# Patient Record
Sex: MALE | Race: OTHER | HISPANIC OR LATINO | URBAN - METROPOLITAN AREA
[De-identification: names, ages, dates, MRNs, and addresses within clinical notes are randomized per-mention and may not be internally consistent; named-entity substitution may affect disease eponyms.]

---

## 2019-02-02 ENCOUNTER — INPATIENT (INPATIENT)
Facility: HOSPITAL | Age: 54
LOS: 2 days | Discharge: HOME | DRG: 948 | End: 2019-02-05
Attending: PSYCHIATRY & NEUROLOGY | Admitting: PSYCHIATRY & NEUROLOGY
Payer: COMMERCIAL

## 2019-02-02 VITALS
OXYGEN SATURATION: 98 % | HEART RATE: 61 BPM | DIASTOLIC BLOOD PRESSURE: 113 MMHG | TEMPERATURE: 98 F | RESPIRATION RATE: 18 BRPM | WEIGHT: 189.6 LBS | SYSTOLIC BLOOD PRESSURE: 188 MMHG

## 2019-02-02 DIAGNOSIS — Z98.890 OTHER SPECIFIED POSTPROCEDURAL STATES: Chronic | ICD-10-CM

## 2019-02-02 LAB
ALBUMIN SERPL ELPH-MCNC: 4.5 G/DL — SIGNIFICANT CHANGE UP (ref 3.3–5)
ALP SERPL-CCNC: 84 U/L — SIGNIFICANT CHANGE UP (ref 40–120)
ALT FLD-CCNC: 69 U/L — HIGH (ref 10–45)
ANION GAP SERPL CALC-SCNC: 12 MMOL/L — SIGNIFICANT CHANGE UP (ref 5–17)
APPEARANCE UR: CLEAR — SIGNIFICANT CHANGE UP
APTT BLD: 36.9 SEC — HIGH (ref 27.5–36.3)
AST SERPL-CCNC: 39 U/L — SIGNIFICANT CHANGE UP (ref 10–40)
BASOPHILS NFR BLD AUTO: 0.7 % — SIGNIFICANT CHANGE UP (ref 0–2)
BILIRUB SERPL-MCNC: 0.4 MG/DL — SIGNIFICANT CHANGE UP (ref 0.2–1.2)
BILIRUB UR-MCNC: NEGATIVE — SIGNIFICANT CHANGE UP
BUN SERPL-MCNC: 14 MG/DL — SIGNIFICANT CHANGE UP (ref 7–23)
CALCIUM SERPL-MCNC: 9.4 MG/DL — SIGNIFICANT CHANGE UP (ref 8.4–10.5)
CHLORIDE SERPL-SCNC: 103 MMOL/L — SIGNIFICANT CHANGE UP (ref 96–108)
CO2 SERPL-SCNC: 21 MMOL/L — LOW (ref 22–31)
COLOR SPEC: YELLOW — SIGNIFICANT CHANGE UP
CREAT SERPL-MCNC: 0.78 MG/DL — SIGNIFICANT CHANGE UP (ref 0.5–1.3)
DIFF PNL FLD: NEGATIVE — SIGNIFICANT CHANGE UP
EOSINOPHIL NFR BLD AUTO: 4.4 % — SIGNIFICANT CHANGE UP (ref 0–6)
ETHANOL SERPL-MCNC: <10 MG/DL — SIGNIFICANT CHANGE UP (ref 0–10)
GLUCOSE SERPL-MCNC: 114 MG/DL — HIGH (ref 70–99)
GLUCOSE UR QL: NEGATIVE — SIGNIFICANT CHANGE UP
HCT VFR BLD CALC: 47.6 % — SIGNIFICANT CHANGE UP (ref 39–50)
HGB BLD-MCNC: 16.6 G/DL — SIGNIFICANT CHANGE UP (ref 13–17)
INR BLD: 0.93 — SIGNIFICANT CHANGE UP (ref 0.88–1.16)
KETONES UR-MCNC: NEGATIVE — SIGNIFICANT CHANGE UP
LEUKOCYTE ESTERASE UR-ACNC: NEGATIVE — SIGNIFICANT CHANGE UP
LYMPHOCYTES # BLD AUTO: 25.6 % — SIGNIFICANT CHANGE UP (ref 13–44)
MCHC RBC-ENTMCNC: 30.5 PG — SIGNIFICANT CHANGE UP (ref 27–34)
MCHC RBC-ENTMCNC: 34.9 G/DL — SIGNIFICANT CHANGE UP (ref 32–36)
MCV RBC AUTO: 87.5 FL — SIGNIFICANT CHANGE UP (ref 80–100)
MONOCYTES NFR BLD AUTO: 8.2 % — SIGNIFICANT CHANGE UP (ref 2–14)
NEUTROPHILS NFR BLD AUTO: 61.1 % — SIGNIFICANT CHANGE UP (ref 43–77)
NITRITE UR-MCNC: NEGATIVE — SIGNIFICANT CHANGE UP
PH UR: 6.5 — SIGNIFICANT CHANGE UP (ref 5–8)
PLATELET # BLD AUTO: 239 K/UL — SIGNIFICANT CHANGE UP (ref 150–400)
POTASSIUM SERPL-MCNC: 3.9 MMOL/L — SIGNIFICANT CHANGE UP (ref 3.5–5.3)
POTASSIUM SERPL-SCNC: 3.9 MMOL/L — SIGNIFICANT CHANGE UP (ref 3.5–5.3)
PROT SERPL-MCNC: 7.2 G/DL — SIGNIFICANT CHANGE UP (ref 6–8.3)
PROT UR-MCNC: NEGATIVE MG/DL — SIGNIFICANT CHANGE UP
PROTHROM AB SERPL-ACNC: 10.5 SEC — SIGNIFICANT CHANGE UP (ref 10–12.9)
RBC # BLD: 5.44 M/UL — SIGNIFICANT CHANGE UP (ref 4.2–5.8)
RBC # FLD: 12.9 % — SIGNIFICANT CHANGE UP (ref 10.3–16.9)
SODIUM SERPL-SCNC: 136 MMOL/L — SIGNIFICANT CHANGE UP (ref 135–145)
SP GR SPEC: <=1.005 — SIGNIFICANT CHANGE UP (ref 1–1.03)
TROPONIN T SERPL-MCNC: <0.01 NG/ML — SIGNIFICANT CHANGE UP (ref 0–0.01)
UROBILINOGEN FLD QL: 0.2 E.U./DL — SIGNIFICANT CHANGE UP
WBC # BLD: 7.5 K/UL — SIGNIFICANT CHANGE UP (ref 3.8–10.5)
WBC # FLD AUTO: 7.5 K/UL — SIGNIFICANT CHANGE UP (ref 3.8–10.5)

## 2019-02-02 PROCEDURE — 70450 CT HEAD/BRAIN W/O DYE: CPT | Mod: 26,59

## 2019-02-02 PROCEDURE — 0042T: CPT

## 2019-02-02 PROCEDURE — 99285 EMERGENCY DEPT VISIT HI MDM: CPT

## 2019-02-02 PROCEDURE — 70498 CT ANGIOGRAPHY NECK: CPT | Mod: 26

## 2019-02-02 PROCEDURE — 70496 CT ANGIOGRAPHY HEAD: CPT | Mod: 26

## 2019-02-02 RX ORDER — CLOPIDOGREL BISULFATE 75 MG/1
300 TABLET, FILM COATED ORAL ONCE
Qty: 0 | Refills: 0 | Status: COMPLETED | OUTPATIENT
Start: 2019-02-02 | End: 2019-02-02

## 2019-02-02 RX ORDER — HEPARIN SODIUM 5000 [USP'U]/ML
5000 INJECTION INTRAVENOUS; SUBCUTANEOUS EVERY 8 HOURS
Qty: 0 | Refills: 0 | Status: DISCONTINUED | OUTPATIENT
Start: 2019-02-02 | End: 2019-02-05

## 2019-02-02 RX ORDER — LABETALOL HCL 100 MG
10 TABLET ORAL ONCE
Qty: 0 | Refills: 0 | Status: COMPLETED | OUTPATIENT
Start: 2019-02-02 | End: 2019-02-02

## 2019-02-02 RX ORDER — ASPIRIN/CALCIUM CARB/MAGNESIUM 324 MG
325 TABLET ORAL ONCE
Qty: 0 | Refills: 0 | Status: COMPLETED | OUTPATIENT
Start: 2019-02-02 | End: 2019-02-02

## 2019-02-02 RX ORDER — ATORVASTATIN CALCIUM 80 MG/1
80 TABLET, FILM COATED ORAL AT BEDTIME
Qty: 0 | Refills: 0 | Status: DISCONTINUED | OUTPATIENT
Start: 2019-02-02 | End: 2019-02-05

## 2019-02-02 RX ADMIN — Medication 325 MILLIGRAM(S): at 18:07

## 2019-02-02 RX ADMIN — HEPARIN SODIUM 5000 UNIT(S): 5000 INJECTION INTRAVENOUS; SUBCUTANEOUS at 23:50

## 2019-02-02 RX ADMIN — CLOPIDOGREL BISULFATE 300 MILLIGRAM(S): 75 TABLET, FILM COATED ORAL at 18:07

## 2019-02-02 RX ADMIN — ATORVASTATIN CALCIUM 80 MILLIGRAM(S): 80 TABLET, FILM COATED ORAL at 23:50

## 2019-02-02 NOTE — H&P ADULT - ASSESSMENT
53M predominantly Tamazight speaking PMH R sided brain tumor (2001, at [facility]) and knee OA (on meloxicam) presenting with dizziness acutely and L eye stacy-blurry vision followed by LUE and LLE weakness and numbness lasting 30 minutes (resolved in ED), admitted for TIA.

## 2019-02-02 NOTE — ED ADULT TRIAGE NOTE - CHIEF COMPLAINT QUOTE
Patient complaining of dizziness, left sided weakness and left sided blurry vision since approximately 4:15PM today.  Patient denies any CP, SOB, N/V/D, fevers, chills or any other complaints at this time.  , EKG in progress.

## 2019-02-02 NOTE — STROKE CODE NOTE - NS AS STROKE CODE PATIENT KNOWNTIME
Malnutrition Findings:           BMI Findings: Body mass index is 23 12 kg/m²  Poor oral intake  Muscle and fat wasting  Encourage oral intake including protein  Continue Ensure compact TID with meals  Known

## 2019-02-02 NOTE — ED PROVIDER NOTE - OBJECTIVE STATEMENT
53 year old male with history of "brain tumor" presents to ED with concern for sudden onset left sided paresthesias and dizziness today.  Patient states symptoms began while at rest and have slowly improved, however he does still have some residual left sided symptoms.  He denies associated chest pain, shortness of breath, fever, chills, nausea, vomiting, abdominal pain, headache or any additional acute complaints or concerns at this time.

## 2019-02-02 NOTE — H&P ADULT - PROBLEM SELECTOR PLAN 1
Pt with history of LUE and LLE weakness and numbness today and also 20 days ago, resolving within a day. Likely TIA vs. seizure in setting of history of brain tumor  > CT head, CT perfusion, CTA head and neck negative for acute stroke  - vEEG  - MRI brain noncontrast  - TSH, lipid, A1c, coags  - PT/OT  - Lipitor 80  - CHRISSIE/TTE  - HSQ, SCDs  - bedside dysphagia screen; if passes, DASH diet    - CHRISSIE/TTE  - BP goal <180/105

## 2019-02-02 NOTE — H&P ADULT - ATTENDING COMMENTS
53 year-old male with PMH brain tumor presents with left hemiparesis that has mostly improved.  He actually has minimal right hand weakness on exam but otherwise intact. 53 year-old male with PMH brain tumor presents with left hemiparesis that has mostly improved.  He actually has minimal right hand weakness on exam but otherwise intact.    Plan:  1) MRI Brain without and with isael to rule out stroke and rule out tumor involvement.  2) vEEG unless large stroke on MRI  3) Continue Aspirin and Plavix with Atorvastatin while workup in progress.  4) CHRISSIE   5) DVT prophylaxis - Heparin SQ and SCDs in place

## 2019-02-02 NOTE — H&P ADULT - NSHPLABSRESULTS_GEN_ALL_CORE
.  LABS:                         16.6   7.5   )-----------( 239      ( 02 Feb 2019 17:36 )             47.6     02-02    136  |  103  |  14  ----------------------------<  114<H>  3.9   |  21<L>  |  0.78    Ca    9.4      02 Feb 2019 17:36    TPro  7.2  /  Alb  4.5  /  TBili  0.4  /  DBili  x   /  AST  39  /  ALT  69<H>  /  AlkPhos  84  02-02    PT/INR - ( 02 Feb 2019 17:36 )   PT: 10.5 sec;   INR: 0.93          PTT - ( 02 Feb 2019 17:36 )  PTT:36.9 sec  Urinalysis Basic - ( 02 Feb 2019 18:15 )    Color: Yellow / Appearance: Clear / SG: <=1.005 / pH: x  Gluc: x / Ketone: NEGATIVE  / Bili: Negative / Urobili: 0.2 E.U./dL   Blood: x / Protein: NEGATIVE mg/dL / Nitrite: NEGATIVE   Leuk Esterase: NEGATIVE / RBC: x / WBC x   Sq Epi: x / Non Sq Epi: x / Bacteria: x      CARDIAC MARKERS ( 02 Feb 2019 17:36 )  x     / <0.01 ng/mL / x     / x     / x                RADIOLOGY, EKG & ADDITIONAL TESTS: Reviewed.

## 2019-02-02 NOTE — H&P ADULT - HISTORY OF PRESENT ILLNESS
53M predominantly Kyrgyz speaking PMH R sided brain tumor (2001, at [facility]) and knee OA (on meloxicam) presenting with dizziness acutely and L eye stacy-blurry vision followed by LUE and LLE weakness and numbness lasting 30 minutes (resolved in ED). States that 20 days ago, pt experienced the same symptoms, followed by a headache and facial soreness the morning after. Did not seek medical care at the time. Denies any f/c, cp, sob, abdominal pain, n/v/d/c, dysuria.    NIHSS 0  CT head, CT perfusion negative, CTA Head and neck negative 53M predominantly Latvian speaking PMH R sided brain tumor (2001, at [facility]) and knee OA (on meloxicam) presenting with dizziness acutely and L eye stacy-blurry vision followed by LUE and LLE weakness and numbness lasting 30 minutes (resolved in ED). States that 20 days ago, pt experienced the same symptoms, followed by a headache and facial soreness the morning after. Did not seek medical care at the time. Denies any f/c, cp, sob, abdominal pain, n/v/d/c, dysuria.    NIHSS 0  CT head, CT perfusion negative, CTA Head and neck negative    ED Vitals: T 97.5F, HR 55-61, -188/, RR 16-18, O2 % on RA  ED Adm: aspirin 325 x1, Plavix 300 mg x1

## 2019-02-02 NOTE — H&P ADULT - NSHPPHYSICALEXAM_GEN_ALL_CORE
- Mental Status:  AAOx3; speech is fluent with intact naming, repetition, and comprehension  - Cranial Nerves II-XII:    II:  PERRLA; visual fields are full to confrontation  III, IV, VI:  EOMI, no nystagmus  V:  facial sensation is intact in the V1-V3 distribution bilaterally.  VII:  face is symmetric with normal eye closure and smile  VIII:  hearing is intact to finger rub  IX, X:  uvula is midline and soft palate rises symmetrically  XI:  head turning and shoulder shrug are intact bilaterally  XII:  tongue protrudes in the midline  - Motor:  strength is 5/5 throughout; normal muscle bulk and tone throughout  - Sensory:  intact to light touch throughout  - Coordination:  finger-nose-finger and heel-knee-shin intact without dysmetria; rapid alternating hand movements intact  - Gait:  deferred

## 2019-02-02 NOTE — ED PROVIDER NOTE - MEDICAL DECISION MAKING DETAILS
Pt in ED w concern for left sided numbness/tingling and feeling dizzy today.  Code stroke called on patient's ED arrival - neuro stroke team in ED to eval.  All images reviewed, neuro recs for admission to [facility] stroke team under Dr. Campoverde.  Patient aware of plan and agreeable.  Will admit at this time.

## 2019-02-02 NOTE — ED ADULT NURSE NOTE - OBJECTIVE STATEMENT
Patient presents to the ED with right sided numbness, dizziness, vision change to left eye that started at 4:30 this afternoon.  on arrival patient made a stroke code.  AA&OX3 ambulatory with steady gait.  No obvious neuro deficits.  patient brought for stat CT scan, neuro resident at bedside at this time.

## 2019-02-02 NOTE — ED PROVIDER NOTE - EYES, MLM
Clear bilaterally, pupils equal, round and reactive to light. Clear bilaterally, pupils equal, round and reactive to light.  Measuring apx 3 mm, bilaterally.

## 2019-02-02 NOTE — CONSULT NOTE ADULT - SUBJECTIVE AND OBJECTIVE BOX
**STROKE CODE CONSULT NOTE**    Last known well time/Time of onset of symptoms: 4:30pm 2/2/19 today    HPI:  53M predominantly Egyptian speaking PMH R sided brain tumor (2001, at [facility]) and knee OA (on meloxicam) presenting with dizziness acutely and L eye stacy-blurry vision followed by LUE and LLE weakness and numbness lasting 30 minutes (resolved in ED). States that 20 days ago, pt experienced the same symptoms, followed by a headache and facial soreness the morning after. Did not seek medical care at the time. Denies any f/c, cp, sob, abdominal pain, n/v/d/c, dysuria.    NIHSS 0  CT head, CT perfusion negative    Lives at home with wife and 2 daughters. No smoking, alcohol, or drug use.    PAST MEDICAL & SURGICAL HISTORY:  Brain tumor    MEDICATIONS  (STANDING):    MEDICATIONS  (PRN):      Allergies    No Known Allergies    Intolerances        Vital Signs Last 24 Hrs  T(C): 36.4 (02 Feb 2019 17:10), Max: 36.4 (02 Feb 2019 17:10)  T(F): 97.5 (02 Feb 2019 17:10), Max: 97.5 (02 Feb 2019 17:10)  HR: 58 (02 Feb 2019 18:44) (55 - 61)  BP: 152/82 (02 Feb 2019 18:44) (152/82 - 188/113)  BP(mean): --  RR: 16 (02 Feb 2019 18:44) (16 - 18)  SpO2: 98% (02 Feb 2019 18:44) (98% - 100%)    PHYSICAL EXAM:  Constitutional: WDWN; NAD  Cardiovascular: RRR, no appreciable murmurs  Neurologic:  Mental status: Awake, alert and oriented x3.  Recent and remote memory intact.  Naming, repetition and comprehension intact.  Attention/concentration intact.  No dysarthria, no aphasia.  Fund of knowledge appropriate.    Cranial nerves: Pupils equally round and reactive to light, visual fields full, no nystagmus, extraocular muscles intact, V1 through V3 intact bilaterally and symmetric, face symmetric, palate elevation symmetric, tongue was midline, sternocleidomastoid/shoulder shrug strength bilaterally 5/5.    Motor:  Normal bulk and tone, strength 5/5 in bilateral upper and lower extremities.   strength 5/5.   Sensation: Intact to light touch.  No neglect.   Coordination: No dysmetria on finger-to-nose and heel-to-shin.  No clumsiness.  Reflexes: 2+ in upper and lower extremities, downgoing toes bilaterally  Gait: Narrow and steady. No ataxia.  Romberg negative    NIHSS: 0    Fingerstick Blood Glucose: CAPILLARY BLOOD GLUCOSE  110 (02 Feb 2019 17:56)      POCT Blood Glucose.: 110 mg/dL (02 Feb 2019 17:10)       LABS:                        16.6   7.5   )-----------( 239      ( 02 Feb 2019 17:36 )             47.6     02-02    136  |  103  |  14  ----------------------------<  114<H>  3.9   |  21<L>  |  0.78    Ca    9.4      02 Feb 2019 17:36    TPro  7.2  /  Alb  4.5  /  TBili  0.4  /  DBili  x   /  AST  39  /  ALT  69<H>  /  AlkPhos  84  02-02    PT/INR - ( 02 Feb 2019 17:36 )   PT: 10.5 sec;   INR: 0.93          PTT - ( 02 Feb 2019 17:36 )  PTT:36.9 sec  CARDIAC MARKERS ( 02 Feb 2019 17:36 )  x     / <0.01 ng/mL / x     / x     / x          Urinalysis Basic - ( 02 Feb 2019 18:15 )    Color: Yellow / Appearance: Clear / SG: <=1.005 / pH: x  Gluc: x / Ketone: NEGATIVE  / Bili: Negative / Urobili: 0.2 E.U./dL   Blood: x / Protein: NEGATIVE mg/dL / Nitrite: NEGATIVE   Leuk Esterase: NEGATIVE / RBC: x / WBC x   Sq Epi: x / Non Sq Epi: x / Bacteria: x        RADIOLOGY & ADDITIONAL STUDIES:    IV-tPA (Y/N):   No                      Reason IV-tPA not given: Rapid resolution of symptoms    ASSESSMENT/PLAN:  53M predominantly Egyptian speaking PMH R sided brain tumor (2001, at [facility]) and knee OA (on meloxicam) presenting with dizziness acutely and L eye stacy-blurry vision followed by LUE and LLE weakness and numbness lasting 30 minutes.    #L sided weakness  Differentials include TIA vs. seizure in setting of prior brain tumor  - vEEG  - TSH, lipid, A1c, coags  - PT/OT  - Lipitor 80  - CHRISSIE/TTE  - HSQ, SCDs  - bedside dysphagia screen; if passes, DASH diet

## 2019-02-02 NOTE — ED PROVIDER NOTE - NEURO NEGATIVE STATEMENT, MLM
no loss of consciousness, no gait abnormality, no headache, + left sided weakness and numbness/tingling

## 2019-02-02 NOTE — ED PROVIDER NOTE - NEUROLOGICAL SENSATION
present and normal in 4 extremities present and normal in 4 extremities/Sensation intact and symmetric x 4 extremities, + changes to perception of sensation to left upper and lower extremity./DIMINISHED

## 2019-02-02 NOTE — ED ADULT NURSE REASSESSMENT NOTE - NS ED NURSE REASSESS COMMENT FT1
Patient resting comfortably in stretcher, states all symptoms have resolved at this time.  will closely monitor.

## 2019-02-02 NOTE — ED PROVIDER NOTE - MUSCULOSKELETAL NEGATIVE STATEMENT, MLM
no back pain, no gout, no musculoskeletal pain, no neck pain, + left sided weakness and numbness/tingling

## 2019-02-03 DIAGNOSIS — G45.9 TRANSIENT CEREBRAL ISCHEMIC ATTACK, UNSPECIFIED: ICD-10-CM

## 2019-02-03 DIAGNOSIS — Z29.9 ENCOUNTER FOR PROPHYLACTIC MEASURES, UNSPECIFIED: ICD-10-CM

## 2019-02-03 DIAGNOSIS — Z91.89 OTHER SPECIFIED PERSONAL RISK FACTORS, NOT ELSEWHERE CLASSIFIED: ICD-10-CM

## 2019-02-03 DIAGNOSIS — R63.8 OTHER SYMPTOMS AND SIGNS CONCERNING FOOD AND FLUID INTAKE: ICD-10-CM

## 2019-02-03 DIAGNOSIS — D49.6 NEOPLASM OF UNSPECIFIED BEHAVIOR OF BRAIN: ICD-10-CM

## 2019-02-03 LAB
ANION GAP SERPL CALC-SCNC: 12 MMOL/L — SIGNIFICANT CHANGE UP (ref 5–17)
BUN SERPL-MCNC: 14 MG/DL — SIGNIFICANT CHANGE UP (ref 7–23)
CALCIUM SERPL-MCNC: 9.3 MG/DL — SIGNIFICANT CHANGE UP (ref 8.4–10.5)
CHLORIDE SERPL-SCNC: 106 MMOL/L — SIGNIFICANT CHANGE UP (ref 96–108)
CHOLEST SERPL-MCNC: 269 MG/DL — HIGH (ref 10–199)
CO2 SERPL-SCNC: 22 MMOL/L — SIGNIFICANT CHANGE UP (ref 22–31)
CREAT SERPL-MCNC: 0.96 MG/DL — SIGNIFICANT CHANGE UP (ref 0.5–1.3)
GLUCOSE SERPL-MCNC: 93 MG/DL — SIGNIFICANT CHANGE UP (ref 70–99)
HCT VFR BLD CALC: 45.8 % — SIGNIFICANT CHANGE UP (ref 39–50)
HDLC SERPL-MCNC: 48 MG/DL — SIGNIFICANT CHANGE UP
HGB BLD-MCNC: 15.7 G/DL — SIGNIFICANT CHANGE UP (ref 13–17)
LIPID PNL WITH DIRECT LDL SERPL: 191 MG/DL — HIGH
MAGNESIUM SERPL-MCNC: 2.3 MG/DL — SIGNIFICANT CHANGE UP (ref 1.6–2.6)
MCHC RBC-ENTMCNC: 30.3 PG — SIGNIFICANT CHANGE UP (ref 27–34)
MCHC RBC-ENTMCNC: 34.3 G/DL — SIGNIFICANT CHANGE UP (ref 32–36)
MCV RBC AUTO: 88.2 FL — SIGNIFICANT CHANGE UP (ref 80–100)
PHOSPHATE SERPL-MCNC: 4.2 MG/DL — SIGNIFICANT CHANGE UP (ref 2.5–4.5)
PLATELET # BLD AUTO: 233 K/UL — SIGNIFICANT CHANGE UP (ref 150–400)
POTASSIUM SERPL-MCNC: 4.1 MMOL/L — SIGNIFICANT CHANGE UP (ref 3.5–5.3)
POTASSIUM SERPL-SCNC: 4.1 MMOL/L — SIGNIFICANT CHANGE UP (ref 3.5–5.3)
RBC # BLD: 5.19 M/UL — SIGNIFICANT CHANGE UP (ref 4.2–5.8)
RBC # FLD: 13.2 % — SIGNIFICANT CHANGE UP (ref 10.3–16.9)
SODIUM SERPL-SCNC: 140 MMOL/L — SIGNIFICANT CHANGE UP (ref 135–145)
TOTAL CHOLESTEROL/HDL RATIO MEASUREMENT: 5.6 RATIO — SIGNIFICANT CHANGE UP (ref 3.4–9.6)
TRIGL SERPL-MCNC: 150 MG/DL — HIGH (ref 10–149)
TSH SERPL-MCNC: 1.83 UIU/ML — SIGNIFICANT CHANGE UP (ref 0.35–4.94)
WBC # BLD: 7 K/UL — SIGNIFICANT CHANGE UP (ref 3.8–10.5)
WBC # FLD AUTO: 7 K/UL — SIGNIFICANT CHANGE UP (ref 3.8–10.5)

## 2019-02-03 PROCEDURE — 99223 1ST HOSP IP/OBS HIGH 75: CPT

## 2019-02-03 PROCEDURE — 70553 MRI BRAIN STEM W/O & W/DYE: CPT | Mod: 26

## 2019-02-03 RX ORDER — INFLUENZA VIRUS VACCINE 15; 15; 15; 15 UG/.5ML; UG/.5ML; UG/.5ML; UG/.5ML
0.5 SUSPENSION INTRAMUSCULAR ONCE
Qty: 0 | Refills: 0 | Status: COMPLETED | OUTPATIENT
Start: 2019-02-03 | End: 2019-02-05

## 2019-02-03 RX ORDER — ASPIRIN/CALCIUM CARB/MAGNESIUM 324 MG
81 TABLET ORAL DAILY
Qty: 0 | Refills: 0 | Status: DISCONTINUED | OUTPATIENT
Start: 2019-02-03 | End: 2019-02-05

## 2019-02-03 RX ORDER — CLOPIDOGREL BISULFATE 75 MG/1
75 TABLET, FILM COATED ORAL DAILY
Qty: 0 | Refills: 0 | Status: DISCONTINUED | OUTPATIENT
Start: 2019-02-03 | End: 2019-02-05

## 2019-02-03 RX ADMIN — Medication 81 MILLIGRAM(S): at 13:55

## 2019-02-03 RX ADMIN — HEPARIN SODIUM 5000 UNIT(S): 5000 INJECTION INTRAVENOUS; SUBCUTANEOUS at 13:55

## 2019-02-03 RX ADMIN — HEPARIN SODIUM 5000 UNIT(S): 5000 INJECTION INTRAVENOUS; SUBCUTANEOUS at 22:31

## 2019-02-03 RX ADMIN — ATORVASTATIN CALCIUM 80 MILLIGRAM(S): 80 TABLET, FILM COATED ORAL at 22:31

## 2019-02-03 RX ADMIN — HEPARIN SODIUM 5000 UNIT(S): 5000 INJECTION INTRAVENOUS; SUBCUTANEOUS at 06:45

## 2019-02-03 RX ADMIN — CLOPIDOGREL BISULFATE 75 MILLIGRAM(S): 75 TABLET, FILM COATED ORAL at 13:55

## 2019-02-03 NOTE — PATIENT PROFILE ADULT - NSPROGENOTHERPROVIDER_GEN_A_NUR
CALLED AND ADVISED PER DRCHRISTIANA NEEDS TO BE SEEN SHE IS SCHED TO SEE JELLY TOMORROW AT 2PM.KW none

## 2019-02-03 NOTE — OCCUPATIONAL THERAPY INITIAL EVALUATION ADULT - MANUAL MUSCLE TESTING RESULTS, REHAB EVAL
Smile symmetrical, tongue protrusion in midline, cheeks puff and eyebrows elevation grossly intact; bilateral upper extremities 5/5 throughout

## 2019-02-03 NOTE — PATIENT PROFILE ADULT - VISION (WITH CORRECTIVE LENSES IF THE PATIENT USUALLY WEARS THEM):
Partially impaired: cannot see medication labels or newsprint, but can see obstacles in path, and the surrounding layout; can count fingers at arm's length/Patient wears reading glasses

## 2019-02-03 NOTE — PHYSICAL THERAPY INITIAL EVALUATION ADULT - PERTINENT HX OF CURRENT PROBLEM, REHAB EVAL
53M presenting with dizziness acutely and L eye stacy-blurry vision followed by LUE and LLE weakness and numbness lasting 30 minutes (resolved in ED).

## 2019-02-03 NOTE — PHYSICAL THERAPY INITIAL EVALUATION ADULT - MODALITIES TREATMENT COMMENTS
Facial nerve: symmetrical; Tongue: midline; Visual tracking all directions intact; Peripheral vision intact; Visual fields intact

## 2019-02-03 NOTE — PHYSICAL THERAPY INITIAL EVALUATION ADULT - COORDINATION ASSESSED, REHAB EVAL
finger to nose/heel to shin/BLE heel to shin intact 3/3 BLE heel to shin intact 3/3/finger to nose/heel to shin

## 2019-02-03 NOTE — PROGRESS NOTE ADULT - SUBJECTIVE AND OBJECTIVE BOX
OVERNIGHT EVENTS:    SUBJECTIVE:    Vital Signs Last 12 Hrs  T(F): 98.1 (02-03-19 @ 14:49), Max: 98.1 (02-03-19 @ 10:42)  HR: 72 (02-03-19 @ 14:20) (52 - 80)  BP: 138/79 (02-03-19 @ 14:20) (117/70 - 138/79)  BP(mean): 102 (02-03-19 @ 14:20) (89 - 102)  RR: 19 (02-03-19 @ 05:02) (19 - 19)  SpO2: 96% (02-03-19 @ 14:20) (95% - 96%)  I&O's Summary       Mental Status:  AAOx3; speech is fluent with intact naming, repetition, and comprehension  	- Cranial Nerves II-XII:    	II:  PERRLA; visual fields are full to confrontation  	III, IV, VI:  EOMI, no nystagmus  	V:  facial sensation is intact in the V1-V3 distribution bilaterally.  	VII:  face is symmetric with normal eye closure and smile  	VIII:  hearing is intact to finger rub  	IX, X:  uvula is midline and soft palate rises symmetrically  	XI:  head turning and shoulder shrug are intact bilaterally  	XII:  tongue protrudes in the midline  	- Motor:  strength is 5/5 throughout; normal muscle bulk and tone throughout  	- Sensory:  intact to light touch throughout  	- Coordination:  finger-nose-finger and heel-knee-shin intact without dysmetria; rapid alternating hand movements intact  - Gait:  deferred      LABS:                        15.7   7.0   )-----------( 233      ( 03 Feb 2019 06:46 )             45.8     02-03    140  |  106  |  14  ----------------------------<  93  4.1   |  22  |  0.96    Ca    9.3      03 Feb 2019 06:46  Phos  4.2     02-03  Mg     2.3     02-03    TPro  7.2  /  Alb  4.5  /  TBili  0.4  /  DBili  x   /  AST  39  /  ALT  69<H>  /  AlkPhos  84  02-02    PT/INR - ( 02 Feb 2019 17:36 )   PT: 10.5 sec;   INR: 0.93          PTT - ( 02 Feb 2019 17:36 )  PTT:36.9 sec  Urinalysis Basic - ( 02 Feb 2019 18:15 )    Color: Yellow / Appearance: Clear / SG: <=1.005 / pH: x  Gluc: x / Ketone: NEGATIVE  / Bili: Negative / Urobili: 0.2 E.U./dL   Blood: x / Protein: NEGATIVE mg/dL / Nitrite: NEGATIVE   Leuk Esterase: NEGATIVE / RBC: x / WBC x   Sq Epi: x / Non Sq Epi: x / Bacteria: x        RADIOLOGY & ADDITIONAL TESTS:    MEDICATIONS  (STANDING):  aspirin enteric coated 81 milliGRAM(s) Oral daily  atorvastatin 80 milliGRAM(s) Oral at bedtime  clopidogrel Tablet 75 milliGRAM(s) Oral daily  heparin  Injectable 5000 Unit(s) SubCutaneous every 8 hours  influenza   Vaccine 0.5 milliLiter(s) IntraMuscular once    MEDICATIONS  (PRN): SUBJECTIVE:  ROS neg.     Vital Signs Last 12 Hrs  T(F): 98.1 (02-03-19 @ 14:49), Max: 98.1 (02-03-19 @ 10:42)  HR: 72 (02-03-19 @ 14:20) (52 - 80)  BP: 138/79 (02-03-19 @ 14:20) (117/70 - 138/79)  BP(mean): 102 (02-03-19 @ 14:20) (89 - 102)  RR: 19 (02-03-19 @ 05:02) (19 - 19)  SpO2: 96% (02-03-19 @ 14:20) (95% - 96%)  I&O's Summary       Mental Status:  AAOx3; speech is fluent with intact naming, repetition, and comprehension  	- Cranial Nerves II-XII:    	II:  PERRLA; visual fields are full to confrontation  	III, IV, VI:  EOMI, no nystagmus  	V:  facial sensation is intact in the V1-V3 distribution bilaterally.  	VII:  face is symmetric with normal eye closure and smile  	VIII:  hearing is intact to finger rub  	IX, X:  uvula is midline and soft palate rises symmetrically  	XI:  head turning and shoulder shrug are intact bilaterally  	XII:  tongue protrudes in the midline  	- Motor:  strength is 5/5 throughout; normal muscle bulk and tone throughout  	- Sensory:  intact to light touch throughout  	- Coordination:  finger-nose-finger and heel-knee-shin intact without dysmetria; rapid alternating hand movements intact  - Gait:  deferred      LABS:                        15.7   7.0   )-----------( 233      ( 03 Feb 2019 06:46 )             45.8     02-03    140  |  106  |  14  ----------------------------<  93  4.1   |  22  |  0.96    Ca    9.3      03 Feb 2019 06:46  Phos  4.2     02-03  Mg     2.3     02-03    TPro  7.2  /  Alb  4.5  /  TBili  0.4  /  DBili  x   /  AST  39  /  ALT  69<H>  /  AlkPhos  84  02-02    PT/INR - ( 02 Feb 2019 17:36 )   PT: 10.5 sec;   INR: 0.93          PTT - ( 02 Feb 2019 17:36 )  PTT:36.9 sec  Urinalysis Basic - ( 02 Feb 2019 18:15 )    Color: Yellow / Appearance: Clear / SG: <=1.005 / pH: x  Gluc: x / Ketone: NEGATIVE  / Bili: Negative / Urobili: 0.2 E.U./dL   Blood: x / Protein: NEGATIVE mg/dL / Nitrite: NEGATIVE   Leuk Esterase: NEGATIVE / RBC: x / WBC x   Sq Epi: x / Non Sq Epi: x / Bacteria: x        RADIOLOGY & ADDITIONAL TESTS:    MEDICATIONS  (STANDING):  aspirin enteric coated 81 milliGRAM(s) Oral daily  atorvastatin 80 milliGRAM(s) Oral at bedtime  clopidogrel Tablet 75 milliGRAM(s) Oral daily  heparin  Injectable 5000 Unit(s) SubCutaneous every 8 hours  influenza   Vaccine 0.5 milliLiter(s) IntraMuscular once    MEDICATIONS  (PRN):

## 2019-02-03 NOTE — PHYSICAL THERAPY INITIAL EVALUATION ADULT - DISCHARGE DISPOSITION, PT EVAL
home/no skilled PT needs/DISCHARGE INPATIENT PT secondary to patient independent and at prior level of function

## 2019-02-03 NOTE — OCCUPATIONAL THERAPY INITIAL EVALUATION ADULT - GENERAL OBSERVATIONS, REHAB EVAL
Right hand dominant. Patient cleared for Occupational Therapy by BURKE Frye, patient received supine in non-acute distress. +Tele, +IV heplock, +EEG. Patient denies pain, no complaint,

## 2019-02-03 NOTE — OCCUPATIONAL THERAPY INITIAL EVALUATION ADULT - PERTINENT HX OF CURRENT PROBLEM, REHAB EVAL
53M predominantly Bengali speaking PMH R sided brain tumor (2001, at [facility]) and knee OA (on meloxicam) presenting with dizziness acutely and L eye stacy-blurry vision followed by LUE and LLE weakness and numbness lasting 30 minutes (resolved in ED). States that 20 days ago, pt experienced the same symptoms, followed by a headache and facial soreness the morning after.

## 2019-02-04 PROBLEM — Z00.00 ENCOUNTER FOR PREVENTIVE HEALTH EXAMINATION: Status: ACTIVE | Noted: 2019-02-04

## 2019-02-04 LAB
ANION GAP SERPL CALC-SCNC: 10 MMOL/L — SIGNIFICANT CHANGE UP (ref 5–17)
BASOPHILS NFR BLD AUTO: 0.5 % — SIGNIFICANT CHANGE UP (ref 0–2)
BUN SERPL-MCNC: 22 MG/DL — SIGNIFICANT CHANGE UP (ref 7–23)
CALCIUM SERPL-MCNC: 9.4 MG/DL — SIGNIFICANT CHANGE UP (ref 8.4–10.5)
CHLORIDE SERPL-SCNC: 105 MMOL/L — SIGNIFICANT CHANGE UP (ref 96–108)
CO2 SERPL-SCNC: 24 MMOL/L — SIGNIFICANT CHANGE UP (ref 22–31)
CREAT SERPL-MCNC: 0.93 MG/DL — SIGNIFICANT CHANGE UP (ref 0.5–1.3)
EOSINOPHIL NFR BLD AUTO: 5.3 % — SIGNIFICANT CHANGE UP (ref 0–6)
GLUCOSE SERPL-MCNC: 114 MG/DL — HIGH (ref 70–99)
HCT VFR BLD CALC: 47.5 % — SIGNIFICANT CHANGE UP (ref 39–50)
HGB BLD-MCNC: 15.9 G/DL — SIGNIFICANT CHANGE UP (ref 13–17)
LYMPHOCYTES # BLD AUTO: 30.5 % — SIGNIFICANT CHANGE UP (ref 13–44)
MAGNESIUM SERPL-MCNC: 2.3 MG/DL — SIGNIFICANT CHANGE UP (ref 1.6–2.6)
MCHC RBC-ENTMCNC: 30.1 PG — SIGNIFICANT CHANGE UP (ref 27–34)
MCHC RBC-ENTMCNC: 33.5 G/DL — SIGNIFICANT CHANGE UP (ref 32–36)
MCV RBC AUTO: 89.8 FL — SIGNIFICANT CHANGE UP (ref 80–100)
MONOCYTES NFR BLD AUTO: 8.2 % — SIGNIFICANT CHANGE UP (ref 2–14)
NEUTROPHILS NFR BLD AUTO: 55.5 % — SIGNIFICANT CHANGE UP (ref 43–77)
PLATELET # BLD AUTO: 226 K/UL — SIGNIFICANT CHANGE UP (ref 150–400)
POTASSIUM SERPL-MCNC: 4.1 MMOL/L — SIGNIFICANT CHANGE UP (ref 3.5–5.3)
POTASSIUM SERPL-SCNC: 4.1 MMOL/L — SIGNIFICANT CHANGE UP (ref 3.5–5.3)
RBC # BLD: 5.29 M/UL — SIGNIFICANT CHANGE UP (ref 4.2–5.8)
RBC # FLD: 13.1 % — SIGNIFICANT CHANGE UP (ref 10.3–16.9)
SODIUM SERPL-SCNC: 139 MMOL/L — SIGNIFICANT CHANGE UP (ref 135–145)
WBC # BLD: 6.6 K/UL — SIGNIFICANT CHANGE UP (ref 3.8–10.5)
WBC # FLD AUTO: 6.6 K/UL — SIGNIFICANT CHANGE UP (ref 3.8–10.5)

## 2019-02-04 PROCEDURE — 93325 DOPPLER ECHO COLOR FLOW MAPG: CPT | Mod: 26

## 2019-02-04 PROCEDURE — 93312 ECHO TRANSESOPHAGEAL: CPT | Mod: 26

## 2019-02-04 PROCEDURE — 99233 SBSQ HOSP IP/OBS HIGH 50: CPT

## 2019-02-04 PROCEDURE — 95951: CPT | Mod: 26

## 2019-02-04 PROCEDURE — 93320 DOPPLER ECHO COMPLETE: CPT | Mod: 26

## 2019-02-04 RX ADMIN — HEPARIN SODIUM 5000 UNIT(S): 5000 INJECTION INTRAVENOUS; SUBCUTANEOUS at 22:06

## 2019-02-04 RX ADMIN — Medication 81 MILLIGRAM(S): at 15:10

## 2019-02-04 RX ADMIN — ATORVASTATIN CALCIUM 80 MILLIGRAM(S): 80 TABLET, FILM COATED ORAL at 22:06

## 2019-02-04 RX ADMIN — CLOPIDOGREL BISULFATE 75 MILLIGRAM(S): 75 TABLET, FILM COATED ORAL at 15:11

## 2019-02-04 RX ADMIN — HEPARIN SODIUM 5000 UNIT(S): 5000 INJECTION INTRAVENOUS; SUBCUTANEOUS at 15:11

## 2019-02-04 NOTE — PROGRESS NOTE ADULT - ATTENDING COMMENTS
Patient seen and examined with Resident.  Agree with above.  He's doing better each day.  Reviewed his MRI Brain without Dr. Lora, Neuroradiology, who thinks that the dot on DWI is related to blood vessel near previous tumor zone.  Clear borders of the craniotomy.  Therefore, no acute infarct.  vEEG - no seizures.  Discussed with Dr. Galindo - will continue vEEG for another 24 hours and then decide if start low dose AED.  DVT prophylaxis

## 2019-02-04 NOTE — CONSULT NOTE ADULT - ASSESSMENT
53M predominantly Frisian speaking PMH R sided brain tumor (2001, at [facility]) and knee OA (on meloxicam) presenting with dizziness acutely and L eye stacy-blurry vision followed by LUE and LLE weakness and numbness lasting 30 minutes (resolved in ED), admitted for TIA.    Problem/Plan - 1:  ·  Problem: TIA (transient ischemic attack).  Plan: Pt with history of LUE and LLE weakness and numbness today and also 20 days ago, resolving within a day. Likely TIA vs. seizure in setting of history of brain tumor  > CT head, CT perfusion, CTA head and neck negative for acute stroke  - vEEG  - MRI brain w/ and w/o contrast  - TSH, lipid, A1c, coags  - ASA/plavix  -Lipitor 80  - CHRISSIE/TTE  - HSQ, SCDs  -DASH diet    - BP goal <180/105.     Problem/Plan - 2:  ·  Problem: Brain tumor.  Plan: In setting of brain tumor (2001 s/p resection, not actively being treated, will r/o seizure  - vEEG.

## 2019-02-04 NOTE — PROGRESS NOTE ADULT - SUBJECTIVE AND OBJECTIVE BOX
PGY-1 TRANSFER ACCEPTANCE NOTE: [facility] TO [facility] COURSE:    OVERNIGHT EVENTS:    SUBJECTIVE / INTERVAL HPI: Patient seen and examined at bedside.     VITAL SIGNS:  Vital Signs Last 24 Hrs  T(C): 36.6 (04 Feb 2019 13:38), Max: 36.8 (03 Feb 2019 18:04)  T(F): 97.8 (04 Feb 2019 13:38), Max: 98.2 (03 Feb 2019 18:04)  HR: 54 (04 Feb 2019 08:40) (52 - 66)  BP: 129/87 (04 Feb 2019 08:40) (118/72 - 135/77)  BP(mean): 103 (04 Feb 2019 08:40) (91 - 103)  RR: 17 (04 Feb 2019 08:40) (16 - 22)  SpO2: 96% (04 Feb 2019 08:40) (93% - 96%)    PHYSICAL EXAM:    General: WDWN  HEENT: NCAT; PERRL, anicteric sclera; MMM  Neck: supple, trachea midline  Cardiovascular: S1, S2 normal; RRR, no M/G/R  Respiratory: CTABL; no W/R/R  Gastrointestinal: soft, nontender, nondistended. bowel sounds present.  Skin: no ulcerations or visible rashes appreciated  Extremities: WWP; no edema, clubbing or cyanosis  Vascular: 2+ radial, DP/PT pulses B/L  Neurological: AAOx3; CN II-XII grossly intact; no focal deficits    MEDICATIONS:  MEDICATIONS  (STANDING):  aspirin enteric coated 81 milliGRAM(s) Oral daily  atorvastatin 80 milliGRAM(s) Oral at bedtime  clopidogrel Tablet 75 milliGRAM(s) Oral daily  heparin  Injectable 5000 Unit(s) SubCutaneous every 8 hours  influenza   Vaccine 0.5 milliLiter(s) IntraMuscular once    MEDICATIONS  (PRN):      ALLERGIES:  Allergies    No Known Allergies    Intolerances        LABS:                        15.9   6.6   )-----------( 226      ( 04 Feb 2019 05:36 )             47.5     02-04    139  |  105  |  22  ----------------------------<  114<H>  4.1   |  24  |  0.93    Ca    9.4      04 Feb 2019 05:36  Phos  4.2     02-03  Mg     2.3     02-04    TPro  7.2  /  Alb  4.5  /  TBili  0.4  /  DBili  x   /  AST  39  /  ALT  69<H>  /  AlkPhos  84  02-02    PT/INR - ( 02 Feb 2019 17:36 )   PT: 10.5 sec;   INR: 0.93          PTT - ( 02 Feb 2019 17:36 )  PTT:36.9 sec  Urinalysis Basic - ( 02 Feb 2019 18:15 )    Color: Yellow / Appearance: Clear / SG: <=1.005 / pH: x  Gluc: x / Ketone: NEGATIVE  / Bili: Negative / Urobili: 0.2 E.U./dL   Blood: x / Protein: NEGATIVE mg/dL / Nitrite: NEGATIVE   Leuk Esterase: NEGATIVE / RBC: x / WBC x   Sq Epi: x / Non Sq Epi: x / Bacteria: x      CAPILLARY BLOOD GLUCOSE  110 (02 Feb 2019 17:56)      POCT Blood Glucose.: 110 mg/dL (02 Feb 2019 17:10)      RADIOLOGY & ADDITIONAL TESTS: Reviewed. PGY-1 TRANSFER ACCEPTANCE NOTE: 7 [facility] TO [facility]    HOSPITAL COURSE:  53M predominantly Djiboutian speaking PMH R sided brain tumor (s/p resection in 2001, at [facility]) and knee OA (on meloxicam) who presented with acute onset dizziness and blurry vision of the L eye followed by LUE and LLE weakness and numbness lasting 30 minutes which resolved on its own once arriving to the ED. Of note pt had a prior similar episode 20 days ago, pt experienced the same symptoms, followed by a headache and facial soreness the morning after but never sought medical care at that time. Upon arrival to the ED, VS notable for HTN -188/, remaining VSS. NIHSS=0. Stroke code was called, CT head negative, CT perfusion negative, CTA head and neck negative. Pt given aspirin 325 x1, Plavix 300 mg x1. Pt admitted to [facility] for further workup and management. Pt started on asa 81mg daily, and plavix 75mg daily. MR head negative for infarction but showing focus of encephalomalacia in the right paramedian parietal lobe deep to craniotomy site  consistent with post-surgical changes, without evidence for intracranial mass or pathologic contrast enhancement. CHRISSIE done and negative for clot, negative for shunt, normal EF. PT evaluated by PT and OT, pt is completely independent with no PT or OT needs. Pt started on vEEG monitoring, preliminarily showing mild right parasagittal slowing with breach rhythm suggestive of a similar degree of underlying  dysfunction with local skull defect; no electrographic seizures, definite epileptiform discharges, or paroxysmal clinical events. Pt deemed stable for step down to 7 l[facility] for 24h of additional vEEG monitoring.       OVERNIGHT EVENTS: No acute overnight events.     SUBJECTIVE / INTERVAL HPI: Patient seen and examined at bedside. Currently states his presenting symptoms are entirely resolved. Denies headache, dizziness, blurry vision, weakness, numbness. Denies CP, SOB, nausea, vomiting.     VITAL SIGNS:  Vital Signs Last 24 Hrs  T(C): 36.6 (04 Feb 2019 13:38), Max: 36.8 (03 Feb 2019 18:04)  T(F): 97.8 (04 Feb 2019 13:38), Max: 98.2 (03 Feb 2019 18:04)  HR: 54 (04 Feb 2019 08:40) (52 - 66)  BP: 129/87 (04 Feb 2019 08:40) (118/72 - 135/77)  BP(mean): 103 (04 Feb 2019 08:40) (91 - 103)  RR: 17 (04 Feb 2019 08:40) (16 - 22)  SpO2: 96% (04 Feb 2019 08:40) (93% - 96%)    PHYSICAL EXAM:    General: WDWN male; no acute distress  HEENT: NCAT; PERRL, anicteric sclera; MMM; vEEG leads in place  Neck: supple, trachea midline  Cardiovascular: S1, S2 normal; RRR, no M/G/R  Respiratory: CTABL; no W/R/R  Gastrointestinal: soft, nontender, nondistended. bowel sounds present.  Skin: no ulcerations or visible rashes appreciated  Extremities: WWP; no edema, clubbing or cyanosis  Vascular: 2+ radial, DP/PT pulses B/L  Neurological: AAOx3; CN II-XII grossly intact; no focal deficits    MEDICATIONS:  MEDICATIONS  (STANDING):  aspirin enteric coated 81 milliGRAM(s) Oral daily  atorvastatin 80 milliGRAM(s) Oral at bedtime  clopidogrel Tablet 75 milliGRAM(s) Oral daily  heparin  Injectable 5000 Unit(s) SubCutaneous every 8 hours  influenza   Vaccine 0.5 milliLiter(s) IntraMuscular once    MEDICATIONS  (PRN):      ALLERGIES:  Allergies    No Known Allergies    Intolerances        LABS:                        15.9   6.6   )-----------( 226      ( 04 Feb 2019 05:36 )             47.5     02-04    139  |  105  |  22  ----------------------------<  114<H>  4.1   |  24  |  0.93    Ca    9.4      04 Feb 2019 05:36  Phos  4.2     02-03  Mg     2.3     02-04    TPro  7.2  /  Alb  4.5  /  TBili  0.4  /  DBili  x   /  AST  39  /  ALT  69<H>  /  AlkPhos  84  02-02    PT/INR - ( 02 Feb 2019 17:36 )   PT: 10.5 sec;   INR: 0.93          PTT - ( 02 Feb 2019 17:36 )  PTT:36.9 sec  Urinalysis Basic - ( 02 Feb 2019 18:15 )    Color: Yellow / Appearance: Clear / SG: <=1.005 / pH: x  Gluc: x / Ketone: NEGATIVE  / Bili: Negative / Urobili: 0.2 E.U./dL   Blood: x / Protein: NEGATIVE mg/dL / Nitrite: NEGATIVE   Leuk Esterase: NEGATIVE / RBC: x / WBC x   Sq Epi: x / Non Sq Epi: x / Bacteria: x      CAPILLARY BLOOD GLUCOSE  110 (02 Feb 2019 17:56)      POCT Blood Glucose.: 110 mg/dL (02 Feb 2019 17:10)      RADIOLOGY & ADDITIONAL TESTS: Reviewed.

## 2019-02-04 NOTE — CONSULT NOTE ADULT - SUBJECTIVE AND OBJECTIVE BOX
Patient is a 53y old  Male who presents with a chief complaint of r/o stroke (04 Feb 2019 05:51)       HPI:  53M predominantly Libyan speaking PMH R sided brain tumor (2001, at [facility]) and knee OA (on meloxicam) presenting with dizziness acutely and L eye stacy-blurry vision followed by LUE and LLE weakness and numbness lasting 30 minutes (resolved in ED). States that 20 days ago, pt experienced the same symptoms, followed by a headache and facial soreness the morning after. Did not seek medical care at the time. Denies any f/c, cp, sob, abdominal pain, n/v/d/c, dysuria.    NIHSS 0  CT head, CT perfusion negative, CTA Head and neck negative    ED Vitals: T 97.5F, HR 55-61, -188/, RR 16-18, O2 % on RA  ED Adm: aspirin 325 x1, Plavix 300 mg x1 (02 Feb 2019 20:42)      PAST MEDICAL & SURGICAL HISTORY:  Brain tumor  H/O craniotomy      MEDICATIONS  (STANDING):  aspirin enteric coated 81 milliGRAM(s) Oral daily  atorvastatin 80 milliGRAM(s) Oral at bedtime  clopidogrel Tablet 75 milliGRAM(s) Oral daily  heparin  Injectable 5000 Unit(s) SubCutaneous every 8 hours  influenza   Vaccine 0.5 milliLiter(s) IntraMuscular once    MEDICATIONS  (PRN):      Social History: lives with his wife and 2 daughters in an elevator accessible apartment building, no home care services, works as a cook    Functional Level Prior to Admission: ADL independent, walks without assistive devices    FAMILY HISTORY:      CBC Full  -  ( 04 Feb 2019 05:36 )  WBC Count : 6.6 K/uL  Hemoglobin : 15.9 g/dL  Hematocrit : 47.5 %  Platelet Count - Automated : 226 K/uL  Mean Cell Volume : 89.8 fL  Mean Cell Hemoglobin : 30.1 pg  Mean Cell Hemoglobin Concentration : 33.5 g/dL  Auto Neutrophil # : x  Auto Lymphocyte # : x  Auto Monocyte # : x  Auto Eosinophil # : x  Auto Basophil # : x  Auto Neutrophil % : 55.5 %  Auto Lymphocyte % : 30.5 %  Auto Monocyte % : 8.2 %  Auto Eosinophil % : 5.3 %  Auto Basophil % : 0.5 %      02-04    139  |  105  |  22  ----------------------------<  114<H>  4.1   |  24  |  0.93    Ca    9.4      04 Feb 2019 05:36  Phos  4.2     02-03  Mg     2.3     02-04    TPro  7.2  /  Alb  4.5  /  TBili  0.4  /  DBili  x   /  AST  39  /  ALT  69<H>  /  AlkPhos  84  02-02      Urinalysis Basic - ( 02 Feb 2019 18:15 )    Color: Yellow / Appearance: Clear / SG: <=1.005 / pH: x  Gluc: x / Ketone: NEGATIVE  / Bili: Negative / Urobili: 0.2 E.U./dL   Blood: x / Protein: NEGATIVE mg/dL / Nitrite: NEGATIVE   Leuk Esterase: NEGATIVE / RBC: x / WBC x   Sq Epi: x / Non Sq Epi: x / Bacteria: x          Radiology:    < from: MR Head w/wo IV Cont (02.03.19 @ 13:05) >  EXAM:  MR BRAIN WAW IC                          PROCEDURE DATE:  02/03/2019          INTERPRETATION:  Sagittal, axial and coronal imaging of the brain was   performed utilizing T1, T2, FLAIR and diffusion-weighted sequences.    Images were acquiredboth before and following intravenous contrast   administration.    Contrast dose: 7.5 cc of IV Gadavist.    Clinical information: History of right brain tumor status post resection,   now with left-sided numbness for 30 minutes, resolved.    No prior MRI imaging of the brain is available for review.    There is evidence of previous right parietal craniotomy. There are   underlying encephalomalacic changes in the paramedian right parietal lobe   without evidence of mass or pathologic contrast enhancement. Evaluation   of the remaining intracranial contents demonstrates few patchy and   punctate areas of T2 hyperintensity within the cerebral white matter that   are nonspecific. Ventricular and sulcal caliber is normal for age. There   are no areas of restricted diffusion within the brain parenchyma. There   is no midline shift, mass effect or extracerebral collection. There is no   lesion of the skull or skull base. There are polyps or retention cysts in   the right maxillary antrum with mucosal thickening noted within bilateral   ethmoidal air cells. The mastoids are clear bilaterally. The orbital   contents are unremarkable.    IMPRESSION:    There is a focus of encephalomalacia in the right paramedian parietal   lobe deep to craniotomy site without evidence for intracranial mass or   pathologic contrast enhancement. Diffusion-weighted imaging is negative   for recent infarction.                Vital Signs Last 24 Hrs  T(C): 36.3 (04 Feb 2019 09:19), Max: 36.8 (03 Feb 2019 18:04)  T(F): 97.4 (04 Feb 2019 09:19), Max: 98.2 (03 Feb 2019 18:04)  HR: 54 (04 Feb 2019 08:40) (52 - 80)  BP: 129/87 (04 Feb 2019 08:40) (118/72 - 138/79)  BP(mean): 103 (04 Feb 2019 08:40) (91 - 103)  RR: 17 (04 Feb 2019 08:40) (16 - 22)  SpO2: 96% (04 Feb 2019 08:40) (93% - 96%)    REVIEW OF SYSTEMS:    CONSTITUTIONAL:  fatigue  EYES: No eye pain, visual disturbances, or discharge  ENMT:  No difficulty hearing, tinnitus, vertigo; No sinus or throat pain  NECK: No pain or stiffness  BREASTS: No pain, masses, or nipple discharge  RESPIRATORY: No cough, wheezing, chills or hemoptysis; No shortness of breath  CARDIOVASCULAR: No chest pain, palpitations, dizziness, or leg swelling  GASTROINTESTINAL: No abdominal or epigastric pain. No nausea, vomiting, or hematemesis; No diarrhea or constipation. No melena or hematochezia.  GENITOURINARY: No dysuria, frequency, hematuria, or incontinence  NEUROLOGICAL: No headaches, memory loss, loss of strength, numbness, or tremors  SKIN: No itching, burning, rashes, or lesions   LYMPH NODES: No enlarged glands  ENDOCRINE: No heat or cold intolerance; No hair loss  MUSCULOSKELETAL: No joint pain or swelling; No muscle, back, or extremity pain  PSYCHIATRIC: No depression, anxiety, mood swings, or difficulty sleeping  HEME/LYMPH: No easy bruising, or bleeding gums  ALLERGY AND IMMUNOLOGIC: No hives or eczema  VASCULAR: no swelling, erythema      Physical Exam: WDWN 54 yo  gentleman lying in semi Cabrera's position , c/o feeling tired    Head: normocephalic, atraumatic    Eyes: PERRLA, EOMI, no nystagmus, sclera anicteric    ENT: nasal discharge, uvula midline, no oropharyngeal erythema/exudate    Neck: supple, negative JVD, negative carotid bruits, no thyromegaly    Chest: CTA bilaterally, neg wheeze, rhonchi, rales, crackles, egophany    Cardiovascular: regular rate and rhythm, neg murmurs/rubs/gallops    Abdomen: soft, non distended, non tender, negative rebound/guarding, normal bowel sounds, neg hepatosplenomegaly    Extremities: WWP, neg cyanosis/clubbing/edema, negative calf tenderness to palpation, negative Andrea's sign    :     Neurologic Exam:    Alert and oriented to person, place, date/year, speech fluent w/o dysarthria, recent and remote memory intact, repetition intact, comprehension intact,     Cranial Nerves:     II:                       pupils equal, round and reactive to light, visual fields intact   III/ IV/VI:            extraocular movements intact, neg nystagmus, ptosis  V:                       facial sensation intact, V1-3 normal  VII:                     face symmetric, no droop, normal eye closure and smile  VIII:                    hearing intact to finger rub bilaterally  IX/ X:                 soft palate rise symmetrical  XI:                      head turning, shoulder shrug normal  XII:                     tongue midline    Motor Exam:    Upper Extremities:     RIght:   5/5   /intrinsics               5/5  wrist flexors/extensors               5/5  biceps/triceps               5/5  deltoid               negative drift    Left :     5/5  /intrinsics               5/5  wrist flexors/extensors               5/5 biceps/triceps               5/5 deltoid               negative drift    Lower Extremities:                 Right:     5/5  DF/PF/ evertors/ invertors                5/5  Quad/ hamstrings                5/5  hip flexors/adductors/abductors                negative drift    Left:       5/5  DF/PF/ evertors/ invertors                5/5  Quad/ hamstrings                5/5  hip flexors/adductors/abductors                    negative drift      Sensory:    intact to LT/PP in all UE/LE dermatomes    DTR:            = biceps/     triceps/     brachioradialis                      = patella/   medial hamstring/ankle                      neg clonus                      neg Babinski                      neg Hoffmans    Finger to Nose:  wnl    Heel to Shin:  wnl    Rapid Alternating movements:  wnl    Joint Position Sense:  intact    Romberg:  not tested    Tandem Walking:  not tested    Gait:  not tested        PM&R Impression:    1) TIA  2) no focal weakness        Recommendations:    1) Physical therapy focusing on therapeutic exercises, bed mobility/transfer out of bed evaluation, progressive ambulation with assistive devices prn.    2) Anticipated Disposition Plan/Recs: d/c home with no post discharge rehab needs

## 2019-02-04 NOTE — PROGRESS NOTE ADULT - PROBLEM SELECTOR PLAN 1
Pt with history of LUE and LLE weakness and numbness today and also 20 days ago, resolving within a day. Likely TIA vs. seizure in setting of history of brain tumor  > CT head, CT perfusion, CTA head and neck negative for acute stroke  - vEEG  - MRI brain w/ and w/o contrast  - TSH, lipid, A1c, coags  - PT/OT  - ASA/plavix  -Lipitor 80  - CHRISSIE/TTE  - HSQ, SCDs  -DASH diet    - BP goal <180/105
Pt with history of LUE and LLE weakness and numbness today and also 20 days ago, resolving within a day. Likely TIA vs. seizure in setting of history of brain tumor  > CT head, CT perfusion, CTA head and neck negative for acute stroke  - vEEG in place, f/u with epilepsy  - MRI brain w/ and w/o contrast  - TSH normal, lipids Chol 269 , A1c normal   f/ucoags   - P T/OT  - ASA/plavix  -Lipitor 80  - CHRISSIE neg for PFO/thrombi  - HSQ, SCDs  -DASH diet    - BP goal <180/105
Pt with history of LUE and LLE weakness and numbness today and also 20 days ago, resolving within a day. Likely TIA vs. seizure in setting of history of brain tumor  > CT head, CT perfusion, CTA head and neck negative for acute stroke  - MRI brain w/ and w/o contrast negative for ischemia; showing post-surgical changes c/w prior known surgery  - CHRISSIE negative for clot, negative for shunt, normal EF  - vEEG in place- preliminarily showing mild right parasagittal slowing with breach rhythm suggestive of a similar degree of underlying  dysfunction with local skull defect; no electrographic seizures, definite epileptiform discharges, or paroxysmal clinical events  - F/u additional 24h vEEG monitoring with epilepsy team  - TSH normal, lipids Chol 269 , A1c normal   - P T/OT evaluated patient, pt independent; no PT or OT needs  - C/w ASA 81mg po daily, plavix 75mg po daily  - C/w Lipitor 80mg PO qHS  - HSQ, SCDs  - DASH diet

## 2019-02-04 NOTE — PROGRESS NOTE ADULT - PROBLEM SELECTOR PLAN 5
1) PCP Contacted on Admission: (Y/N) --> Name & Phone #:  2) Date of Contact with PCP:  3) PCP Contacted at Discharge: (Y/N)  4) Summary of Handoff Given to PCP:   5) Post-Discharge Appointment Date and Location:

## 2019-02-04 NOTE — PROGRESS NOTE ADULT - ASSESSMENT
53M predominantly Amharic speaking PMH R sided brain tumor (2001, at [facility]) and knee OA (on meloxicam) presenting with dizziness acutely and L eye stacy-blurry vision followed by LUE and LLE weakness and numbness lasting 30 minutes (resolved in ED), admitted for TIA.
53M predominantly Kinyarwanda speaking PMH R sided brain tumor (2001, at [facility]) and knee OA (on meloxicam) presenting with dizziness acutely and L eye stacy-blurry vision followed by LUE and LLE weakness and numbness lasting 30 minutes (resolved in ED), admitted for TIA.
53M predominantly Arabic speaking PMH R sided brain tumor (2001, at [facility]) and knee OA (on meloxicam) presenting with dizziness acutely and L eye stacy-blurry vision followed by LUE and LLE weakness and numbness lasting 30 minutes (resolved in ED), admitted for TIA, being stepped down to 7 robin for vEEG monitoring.

## 2019-02-04 NOTE — PROGRESS NOTE ADULT - PROBLEM SELECTOR PLAN 4
F: none  E: replete PRN  N: DASH diet

## 2019-02-04 NOTE — PROGRESS NOTE ADULT - SUBJECTIVE AND OBJECTIVE BOX
Hospital Course for Stepdown    53M predominantly Pap[place]inean speaking PMH R sided brain tumor (2001, at [facility]) and knee OA (on meloxicam) presenting with dizziness acutely and L eye stacy-blurry vision followed by LUE and LLE weakness and numbness lasting 30 minutes (resolved in ED), admitted for TIA vs seizure w/u. EEG monitoring initiated; f/u with epilepsy regarding activity. CHRISSIE completed this morning and neg for thrombi or PFO. Consider need for asa/plav if stroke/TIA r/o. He is hemodynamically stable for trf to 7W for con't EEG monitoring.     EEG on ON. NPO for CHRISSIE    SUBJECTIVE:  ROS neg.     Vital Signs Last 24 Hrs  T(C): 36.6 (04 Feb 2019 13:38), Max: 36.8 (03 Feb 2019 18:04)  T(F): 97.8 (04 Feb 2019 13:38), Max: 98.2 (03 Feb 2019 18:04)  HR: 54 (04 Feb 2019 14:55) (52 - 66)  BP: 116/71 (04 Feb 2019 14:55) (116/71 - 135/77)  BP(mean): 103 (04 Feb 2019 08:40) (91 - 103)  RR: 17 (04 Feb 2019 08:40) (16 - 22)  SpO2: 95% (04 Feb 2019 14:55) (93% - 96%)     Mental Status:  AAOx3; speech is fluent with intact naming, repetition, and comprehension  	- Cranial Nerves II-XII:    	II:  PERRLA; visual fields are full to confrontation  	III, IV, VI:  EOMI, no nystagmus  	V:  facial sensation is intact in the V1-V3 distribution bilaterally.  	VII:  face is symmetric with normal eye closure and smile  	VIII:  hearing is intact to finger rub  	IX, X:  uvula is midline and soft palate rises symmetrically  	XI:  head turning and shoulder shrug are intact bilaterally  	XII:  tongue protrudes in the midline  	- Motor:  strength is 5/5 throughout; normal muscle bulk and tone throughout  	- Sensory:  intact to light touch throughout  	- Coordination:  finger-nose-finger and heel-knee-shin intact without dysmetria; rapid alternating hand movements intact  - Gait:  deferred    MEDICATIONS  (STANDING):  aspirin enteric coated 81 milliGRAM(s) Oral daily  atorvastatin 80 milliGRAM(s) Oral at bedtime  clopidogrel Tablet 75 milliGRAM(s) Oral daily  heparin  Injectable 5000 Unit(s) SubCutaneous every 8 hours  influenza   Vaccine 0.5 milliLiter(s) IntraMuscular once    MEDICATIONS  (PRN):  .  LABS:                         15.9   6.6   )-----------( 226      ( 04 Feb 2019 05:36 )             47.5     02-04    139  |  105  |  22  ----------------------------<  114<H>  4.1   |  24  |  0.93    Ca    9.4      04 Feb 2019 05:36  Phos  4.2     02-03  Mg     2.3     02-04    TPro  7.2  /  Alb  4.5  /  TBili  0.4  /  DBili  x   /  AST  39  /  ALT  69<H>  /  AlkPhos  84  02-02    PT/INR - ( 02 Feb 2019 17:36 )   PT: 10.5 sec;   INR: 0.93          PTT - ( 02 Feb 2019 17:36 )  PTT:36.9 sec  Urinalysis Basic - ( 02 Feb 2019 18:15 )    Color: Yellow / Appearance: Clear / SG: <=1.005 / pH: x  Gluc: x / Ketone: NEGATIVE  / Bili: Negative / Urobili: 0.2 E.U./dL   Blood: x / Protein: NEGATIVE mg/dL / Nitrite: NEGATIVE   Leuk Esterase: NEGATIVE / RBC: x / WBC x   Sq Epi: x / Non Sq Epi: x / Bacteria: x      CARDIAC MARKERS ( 02 Feb 2019 17:36 )  x     / <0.01 ng/mL / x     / x     / x                RADIOLOGY, EKG & ADDITIONAL TESTS: Reviewed.

## 2019-02-04 NOTE — PROGRESS NOTE ADULT - PROBLEM SELECTOR PLAN 2
In setting of brain tumor (2001 s/p resection, not actively being treated, will r/o seizure  - vEEG
In setting of brain tumor (2001 s/p resection, not actively being treated, will r/o seizure  - vEEG
In setting of brain tumor (2001 s/p resection, not actively being treated, will r/o seizure  - c/w vEEG monitoring x24 hours, findings as above

## 2019-02-05 ENCOUNTER — TRANSCRIPTION ENCOUNTER (OUTPATIENT)
Age: 54
End: 2019-02-05

## 2019-02-05 VITALS
RESPIRATION RATE: 16 BRPM | OXYGEN SATURATION: 95 % | TEMPERATURE: 98 F | SYSTOLIC BLOOD PRESSURE: 115 MMHG | DIASTOLIC BLOOD PRESSURE: 79 MMHG | HEART RATE: 57 BPM

## 2019-02-05 PROBLEM — D49.6 NEOPLASM OF UNSPECIFIED BEHAVIOR OF BRAIN: Chronic | Status: ACTIVE | Noted: 2019-02-02

## 2019-02-05 LAB
ANION GAP SERPL CALC-SCNC: 11 MMOL/L — SIGNIFICANT CHANGE UP (ref 5–17)
BUN SERPL-MCNC: 19 MG/DL — SIGNIFICANT CHANGE UP (ref 7–23)
CALCIUM SERPL-MCNC: 9.2 MG/DL — SIGNIFICANT CHANGE UP (ref 8.4–10.5)
CHLORIDE SERPL-SCNC: 109 MMOL/L — HIGH (ref 96–108)
CO2 SERPL-SCNC: 22 MMOL/L — SIGNIFICANT CHANGE UP (ref 22–31)
CREAT SERPL-MCNC: 0.9 MG/DL — SIGNIFICANT CHANGE UP (ref 0.5–1.3)
GLUCOSE SERPL-MCNC: 112 MG/DL — HIGH (ref 70–99)
HCT VFR BLD CALC: 47.3 % — SIGNIFICANT CHANGE UP (ref 39–50)
HGB BLD-MCNC: 16.1 G/DL — SIGNIFICANT CHANGE UP (ref 13–17)
MAGNESIUM SERPL-MCNC: 2.2 MG/DL — SIGNIFICANT CHANGE UP (ref 1.6–2.6)
MCHC RBC-ENTMCNC: 30.7 PG — SIGNIFICANT CHANGE UP (ref 27–34)
MCHC RBC-ENTMCNC: 34 G/DL — SIGNIFICANT CHANGE UP (ref 32–36)
MCV RBC AUTO: 90.3 FL — SIGNIFICANT CHANGE UP (ref 80–100)
PHOSPHATE SERPL-MCNC: 3.7 MG/DL — SIGNIFICANT CHANGE UP (ref 2.5–4.5)
PLATELET # BLD AUTO: 222 K/UL — SIGNIFICANT CHANGE UP (ref 150–400)
POTASSIUM SERPL-MCNC: 4.2 MMOL/L — SIGNIFICANT CHANGE UP (ref 3.5–5.3)
POTASSIUM SERPL-SCNC: 4.2 MMOL/L — SIGNIFICANT CHANGE UP (ref 3.5–5.3)
RBC # BLD: 5.24 M/UL — SIGNIFICANT CHANGE UP (ref 4.2–5.8)
RBC # FLD: 13.2 % — SIGNIFICANT CHANGE UP (ref 10.3–16.9)
SODIUM SERPL-SCNC: 142 MMOL/L — SIGNIFICANT CHANGE UP (ref 135–145)
WBC # BLD: 6.4 K/UL — SIGNIFICANT CHANGE UP (ref 3.8–10.5)
WBC # FLD AUTO: 6.4 K/UL — SIGNIFICANT CHANGE UP (ref 3.8–10.5)

## 2019-02-05 PROCEDURE — 80061 LIPID PANEL: CPT

## 2019-02-05 PROCEDURE — 70496 CT ANGIOGRAPHY HEAD: CPT

## 2019-02-05 PROCEDURE — 36415 COLL VENOUS BLD VENIPUNCTURE: CPT

## 2019-02-05 PROCEDURE — G0378: CPT

## 2019-02-05 PROCEDURE — 90686 IIV4 VACC NO PRSV 0.5 ML IM: CPT

## 2019-02-05 PROCEDURE — 85027 COMPLETE CBC AUTOMATED: CPT

## 2019-02-05 PROCEDURE — 81003 URINALYSIS AUTO W/O SCOPE: CPT

## 2019-02-05 PROCEDURE — 83735 ASSAY OF MAGNESIUM: CPT

## 2019-02-05 PROCEDURE — 97161 PT EVAL LOW COMPLEX 20 MIN: CPT

## 2019-02-05 PROCEDURE — 0042T: CPT

## 2019-02-05 PROCEDURE — 85025 COMPLETE CBC W/AUTO DIFF WBC: CPT

## 2019-02-05 PROCEDURE — 70450 CT HEAD/BRAIN W/O DYE: CPT

## 2019-02-05 PROCEDURE — A9585: CPT

## 2019-02-05 PROCEDURE — 70498 CT ANGIOGRAPHY NECK: CPT

## 2019-02-05 PROCEDURE — 80053 COMPREHEN METABOLIC PANEL: CPT

## 2019-02-05 PROCEDURE — 95951: CPT

## 2019-02-05 PROCEDURE — 84443 ASSAY THYROID STIM HORMONE: CPT

## 2019-02-05 PROCEDURE — 70553 MRI BRAIN STEM W/O & W/DYE: CPT

## 2019-02-05 PROCEDURE — 84100 ASSAY OF PHOSPHORUS: CPT

## 2019-02-05 PROCEDURE — 85730 THROMBOPLASTIN TIME PARTIAL: CPT

## 2019-02-05 PROCEDURE — 99285 EMERGENCY DEPT VISIT HI MDM: CPT

## 2019-02-05 PROCEDURE — 80307 DRUG TEST PRSMV CHEM ANLYZR: CPT

## 2019-02-05 PROCEDURE — 93312 ECHO TRANSESOPHAGEAL: CPT

## 2019-02-05 PROCEDURE — 83036 HEMOGLOBIN GLYCOSYLATED A1C: CPT

## 2019-02-05 PROCEDURE — 80048 BASIC METABOLIC PNL TOTAL CA: CPT

## 2019-02-05 PROCEDURE — 82962 GLUCOSE BLOOD TEST: CPT

## 2019-02-05 PROCEDURE — 84484 ASSAY OF TROPONIN QUANT: CPT

## 2019-02-05 PROCEDURE — 99238 HOSP IP/OBS DSCHRG MGMT 30/<: CPT

## 2019-02-05 PROCEDURE — 95951: CPT | Mod: 26

## 2019-02-05 PROCEDURE — 85610 PROTHROMBIN TIME: CPT

## 2019-02-05 RX ORDER — GABAPENTIN 400 MG/1
1 CAPSULE ORAL
Qty: 90 | Refills: 0 | OUTPATIENT
Start: 2019-02-05 | End: 2019-03-06

## 2019-02-05 RX ORDER — ATORVASTATIN CALCIUM 80 MG/1
1 TABLET, FILM COATED ORAL
Qty: 30 | Refills: 0 | OUTPATIENT
Start: 2019-02-05

## 2019-02-05 RX ORDER — MELOXICAM 15 MG/1
1 TABLET ORAL
Qty: 0 | Refills: 0 | COMMUNITY

## 2019-02-05 RX ORDER — GABAPENTIN 400 MG/1
300 CAPSULE ORAL ONCE
Qty: 0 | Refills: 0 | Status: COMPLETED | OUTPATIENT
Start: 2019-02-05 | End: 2019-02-05

## 2019-02-05 RX ADMIN — INFLUENZA VIRUS VACCINE 0.5 MILLILITER(S): 15; 15; 15; 15 SUSPENSION INTRAMUSCULAR at 14:33

## 2019-02-05 RX ADMIN — CLOPIDOGREL BISULFATE 75 MILLIGRAM(S): 75 TABLET, FILM COATED ORAL at 11:58

## 2019-02-05 RX ADMIN — GABAPENTIN 300 MILLIGRAM(S): 400 CAPSULE ORAL at 11:58

## 2019-02-05 RX ADMIN — Medication 81 MILLIGRAM(S): at 11:58

## 2019-02-05 RX ADMIN — HEPARIN SODIUM 5000 UNIT(S): 5000 INJECTION INTRAVENOUS; SUBCUTANEOUS at 06:44

## 2019-02-05 NOTE — DISCHARGE NOTE ADULT - PLAN OF CARE
Resolution of your symptoms You came to the hospital after an episode of numbness, weakness, and tingling that resolved on its own. We worked you up for stroke and this workup was negative. We also did a video EEG to rule out seizure and you did not have any seizure activity. You were found to have high cholesterol and were started on a medication called atorvastatin. Please continue to take your regular home medications and atorvastatin and follow up with Dr Colin (neurology) on March 15 at 1pm. Please also make an appt to follow up with your primary care doctor in 1-2 weeks. You came to the hospital after an episode of numbness, weakness, and tingling that resolved on its own. We worked you up for stroke and this workup was negative. We also did a video EEG to rule out seizure and you did not have any seizure activity. You were found to have high cholesterol and were started on a medication called atorvastatin. Please continue to take your atorvastatin and follow up with Dr Colin (neurology) on March 15 at 1pm. Please also make an appt to follow up with your primary care doctor in 1-2 weeks. Please no longer take meloxicam for pain, You have been prescribed gabapentin 300mg three times daily for pain.

## 2019-02-05 NOTE — DISCHARGE NOTE ADULT - ADDITIONAL INSTRUCTIONS
Please follow up with Dr Colin (Neurology) on March 15 at 1pm.   Please make an appt to follow up with your primary care doctor in 1-2 weeks.

## 2019-02-05 NOTE — DISCHARGE NOTE ADULT - MEDICATION SUMMARY - MEDICATIONS TO STOP TAKING
I will STOP taking the medications listed below when I get home from the hospital:  None I will STOP taking the medications listed below when I get home from the hospital:    meloxicam 15 mg oral tablet  -- 1 tab(s) by mouth once a day

## 2019-02-05 NOTE — DISCHARGE NOTE ADULT - HOSPITAL COURSE
53M predominantly Bengali speaking PMH R sided brain tumor (s/p resection in 2001, at [facility]) and knee OA (on meloxicam) who presented with acute onset dizziness and blurry vision of the L eye followed by LUE and LLE weakness and numbness lasting 30 minutes which resolved on its own once arriving to the ED. Of note pt had a prior similar episode 20 days ago, pt experienced the same symptoms, followed by a headache and facial soreness the morning after but never sought medical care at that time. Upon arrival to the ED, VS notable for HTN -188/, remaining VSS. NIHSS=0. Stroke code was called, CT head negative, CT perfusion negative, CTA head and neck negative. Pt given aspirin 325 x1, Plavix 300 mg x1. Pt admitted to [facility] for further workup and management. Pt started on asa 81mg daily, and plavix 75mg daily. MR head negative for infarction but showing focus of encephalomalacia in the right paramedian parietal lobe deep to craniotomy site  consistent with post-surgical changes, without evidence for intracranial mass or pathologic contrast enhancement. CHRISSIE done and negative for clot, negative for shunt, normal EF. PT evaluated by PT and OT, pt is completely independent with no PT or OT needs. Pt started on vEEG monitoring, preliminarily showing mild right parasagittal slowing with breach rhythm suggestive of a similar degree of underlying  dysfunction with local skull defect; no electrographic seizures, definite epileptiform discharges, or paroxysmal clinical events. Pt deemed stable for step down to [address] [place] [address] for 24h of additional vEEG monitoring. vEEG on [address] [place] [address] was negative for seizure. Pt currently with resolution of presenting symptoms, hemodynamically stable, stable for discharge with outpatient follow up. 53M predominantly Azeri speaking PMH R sided brain tumor (s/p resection in 2001, at [facility]) and knee OA (on meloxicam) who presented with acute onset dizziness and blurry vision of the L eye followed by LUE and LLE weakness and numbness lasting 30 minutes which resolved on its own once arriving to the ED. Of note pt had a prior similar episode 20 days ago, pt experienced the same symptoms, followed by a headache and facial soreness the morning after but never sought medical care at that time. Upon arrival to the ED, VS notable for HTN -188/, remaining VSS. NIHSS=0. Stroke code was called, CT head negative, CT perfusion negative, CTA head and neck negative. Pt given aspirin 325 x1, Plavix 300 mg x1. Pt admitted to [facility] for further workup and management. Pt started on asa 81mg daily, and plavix 75mg daily. MR head negative for infarction but showing focus of encephalomalacia in the right paramedian parietal lobe deep to craniotomy site  consistent with post-surgical changes, without evidence for intracranial mass or pathologic contrast enhancement. CHRISSIE done and negative for clot, negative for shunt, normal EF. PT evaluated by PT and OT, pt is completely independent with no PT or OT needs.  Not TIA/CVA.    Pt started on vEEG monitoring, preliminarily showing mild right parasagittal slowing with breach rhythm suggestive of a similar degree of underlying  dysfunction with local skull defect; no electrographic seizures, definite epileptiform discharges, or paroxysmal clinical events. Pt deemed stable for step down to [address] [place] [address] for 24h of additional vEEG monitoring. vEEG on [address] [place] [address] was negative for seizure. Pt currently with resolution of presenting symptoms, hemodynamically stable, stable for discharge with outpatient follow up.

## 2019-02-05 NOTE — DISCHARGE NOTE ADULT - MEDICATION SUMMARY - MEDICATIONS TO TAKE
I will START or STAY ON the medications listed below when I get home from the hospital:    meloxicam 15 mg oral tablet  -- 1 tab(s) by mouth once a day  -- Indication: For leg pain    atorvastatin 40 mg oral tablet  -- 1 tab(s) by mouth once a day (at bedtime)   -- Avoid grapefruit and grapefruit juice while taking this medication.  Do not take this drug if you are pregnant.  It is very important that you take or use this exactly as directed.  Do not skip doses or discontinue unless directed by your doctor.  Obtain medical advice before taking any non-prescription drugs as some may affect the action of this medication.  Take with food or milk.    -- Indication: For High cholesterol I will START or STAY ON the medications listed below when I get home from the hospital:    gabapentin 300 mg oral capsule  -- 1 cap(s) by mouth 3 times a day   -- It is very important that you take or use this exactly as directed.  Do not skip doses or discontinue unless directed by your doctor.  May cause drowsiness.  Alcohol may intensify this effect.  Use care when operating dangerous machinery.    -- Indication: For Leg pain    atorvastatin 40 mg oral tablet  -- 1 tab(s) by mouth once a day (at bedtime)   -- Avoid grapefruit and grapefruit juice while taking this medication.  Do not take this drug if you are pregnant.  It is very important that you take or use this exactly as directed.  Do not skip doses or discontinue unless directed by your doctor.  Obtain medical advice before taking any non-prescription drugs as some may affect the action of this medication.  Take with food or milk.    -- Indication: For High cholesterol

## 2019-02-05 NOTE — DISCHARGE NOTE ADULT - CARE PROVIDER_API CALL
Camila Colin)  Neurology; Vascular Neurology  [address], [address] [place] [address]  Phone: [phone]  Fax: [phone]  Follow Up Time:

## 2019-02-05 NOTE — DISCHARGE NOTE ADULT - CARE PLAN
Principal Discharge DX:	Weakness  Goal:	Resolution of your symptoms  Assessment and plan of treatment:	You came to the hospital after an episode of numbness, weakness, and tingling that resolved on its own. We worked you up for stroke and this workup was negative. We also did a video EEG to rule out seizure and you did not have any seizure activity. You were found to have high cholesterol and were started on a medication called atorvastatin. Please continue to take your regular home medications and atorvastatin and follow up with Dr Colin (neurology) on March 15 at 1pm. Please also make an appt to follow up with your primary care doctor in 1-2 weeks.  Secondary Diagnosis:	Numbness and tingling  Goal:	Resolution of your symptoms  Assessment and plan of treatment:	You came to the hospital after an episode of numbness, weakness, and tingling that resolved on its own. We worked you up for stroke and this workup was negative. We also did a video EEG to rule out seizure and you did not have any seizure activity. You were found to have high cholesterol and were started on a medication called atorvastatin. Please continue to take your regular home medications and atorvastatin and follow up with Dr Colin (neurology) on March 15 at 1pm. Please also make an appt to follow up with your primary care doctor in 1-2 weeks. Principal Discharge DX:	Weakness  Goal:	Resolution of your symptoms  Assessment and plan of treatment:	You came to the hospital after an episode of numbness, weakness, and tingling that resolved on its own. We worked you up for stroke and this workup was negative. We also did a video EEG to rule out seizure and you did not have any seizure activity. You were found to have high cholesterol and were started on a medication called atorvastatin. Please continue to take your atorvastatin and follow up with Dr Colin (neurology) on March 15 at 1pm. Please also make an appt to follow up with your primary care doctor in 1-2 weeks. Please no longer take meloxicam for pain, You have been prescribed gabapentin 300mg three times daily for pain.  Secondary Diagnosis:	Numbness and tingling  Goal:	Resolution of your symptoms  Assessment and plan of treatment:	You came to the hospital after an episode of numbness, weakness, and tingling that resolved on its own. We worked you up for stroke and this workup was negative. We also did a video EEG to rule out seizure and you did not have any seizure activity. You were found to have high cholesterol and were started on a medication called atorvastatin. Please continue to take your atorvastatin and follow up with Dr Colin (neurology) on March 15 at 1pm. Please also make an appt to follow up with your primary care doctor in 1-2 weeks. Please no longer take meloxicam for pain, You have been prescribed gabapentin 300mg three times daily for pain.

## 2019-02-05 NOTE — PROGRESS NOTE ADULT - SUBJECTIVE AND OBJECTIVE BOX
OVERNIGHT EVENTS:    SUBJECTIVE / INTERVAL HPI: Patient seen and examined at bedside.     VITAL SIGNS:  Vital Signs Last 24 Hrs  T(C): 36.6 (05 Feb 2019 05:38), Max: 36.7 (04 Feb 2019 21:30)  T(F): 97.9 (05 Feb 2019 05:38), Max: 98 (04 Feb 2019 21:30)  HR: 60 (05 Feb 2019 05:38) (54 - 62)  BP: 114/70 (05 Feb 2019 05:38) (114/70 - 146/92)  BP(mean): 103 (04 Feb 2019 08:40) (103 - 103)  RR: 15 (05 Feb 2019 05:38) (14 - 17)  SpO2: 97% (05 Feb 2019 05:38) (87% - 97%)    PHYSICAL EXAM:    General: WDWN  HEENT: NCAT; PERRL, anicteric sclera; MMM  Neck: supple, trachea midline  Cardiovascular: S1, S2 normal; RRR, no M/G/R  Respiratory: CTABL; no W/R/R  Gastrointestinal: soft, nontender, nondistended. bowel sounds present.  Skin: no ulcerations or visible rashes appreciated  Extremities: WWP; no edema, clubbing or cyanosis  Vascular: 2+ radial, DP/PT pulses B/L  Neurological: AAOx3; CN II-XII grossly intact; no focal deficits    MEDICATIONS:  MEDICATIONS  (STANDING):  aspirin enteric coated 81 milliGRAM(s) Oral daily  atorvastatin 80 milliGRAM(s) Oral at bedtime  clopidogrel Tablet 75 milliGRAM(s) Oral daily  heparin  Injectable 5000 Unit(s) SubCutaneous every 8 hours  influenza   Vaccine 0.5 milliLiter(s) IntraMuscular once    MEDICATIONS  (PRN):      ALLERGIES:  Allergies    No Known Allergies    Intolerances        LABS:                        16.1   6.4   )-----------( 222      ( 05 Feb 2019 07:03 )             47.3     02-05    142  |  109<H>  |  19  ----------------------------<  112<H>  4.2   |  22  |  0.90    Ca    9.2      05 Feb 2019 07:03  Phos  3.7     02-05  Mg     2.2     02-05          CAPILLARY BLOOD GLUCOSE          RADIOLOGY & ADDITIONAL TESTS: Reviewed. entered in error

## 2019-02-05 NOTE — DISCHARGE NOTE ADULT - PATIENT PORTAL LINK FT
You can access the [facility] Patient Portal, offered by [facility], by registering with the following website: [email or web address]

## 2019-02-06 ENCOUNTER — INBOUND DOCUMENT (OUTPATIENT)
Age: 54
End: 2019-02-06

## 2019-02-07 DIAGNOSIS — Z90.89 ACQUIRED ABSENCE OF OTHER ORGANS: ICD-10-CM

## 2019-02-07 DIAGNOSIS — E78.00 PURE HYPERCHOLESTEROLEMIA, UNSPECIFIED: ICD-10-CM

## 2019-02-07 DIAGNOSIS — I10 ESSENTIAL (PRIMARY) HYPERTENSION: ICD-10-CM

## 2019-02-07 DIAGNOSIS — R53.1 WEAKNESS: ICD-10-CM

## 2019-02-07 DIAGNOSIS — R42 DIZZINESS AND GIDDINESS: ICD-10-CM

## 2019-02-07 DIAGNOSIS — Z86.011 PERSONAL HISTORY OF BENIGN NEOPLASM OF THE BRAIN: ICD-10-CM

## 2019-02-07 DIAGNOSIS — R20.0 ANESTHESIA OF SKIN: ICD-10-CM

## 2019-03-15 ENCOUNTER — APPOINTMENT (OUTPATIENT)
Dept: NEUROLOGY | Facility: CLINIC | Age: 54
End: 2019-03-15

## 2019-06-14 NOTE — PATIENT PROFILE ADULT - FUNCTIONAL SCREEN CURRENT LEVEL: SWALLOWING (IF SCORE 2 OR MORE FOR ANY ITEM, CONSULT REHAB SERVICES), MLM)
[Pelvic Pain] : pelvic pain [Abn Vag Bleeding] : abnormal vaginal bleeding [Nl] : Integumentary [Dysuria] : no dysuria [Frequency] : no frequency [Urgency] : no urgency 0 = swallows foods/liquids without difficulty

## 2023-01-24 NOTE — PHYSICAL THERAPY INITIAL EVALUATION ADULT - SITTING BALANCE: DYNAMIC
good balance Stelara Counseling:  I discussed with the patient the risks of ustekinumab including but not limited to immunosuppression, malignancy, posterior leukoencephalopathy syndrome, and serious infections.  The patient understands that monitoring is required including a PPD at baseline and must alert us or the primary physician if symptoms of infection or other concerning signs are noted.

## 2023-02-07 NOTE — PHYSICAL THERAPY INITIAL EVALUATION ADULT - LEVEL OF INDEPENDENCE: SIT/STAND, REHAB EVAL
Go to ER for worsening symptoms, inability to keep liquids down, inability to urinate for greater than 8 hours or difficulty breathing. Follow-up with your primary care provider. Wear cotton underwear and loose fitting garments. Increase oral fluid intake.
independent

## 2023-03-22 NOTE — PHYSICAL THERAPY INITIAL EVALUATION ADULT - STANDING BALANCE: DYNAMIC, REHAB EVAL
good balance Tranexamic Acid Counseling:  Patient advised of the small risk of bleeding problems with tranexamic acid. They were also instructed to call if they developed any nausea, vomiting or diarrhea. All of the patient's questions and concerns were addressed.

## 2025-01-06 NOTE — PATIENT PROFILE ADULT - NSASFUNCLEVELADLAMBULATE_GEN_A_NUR
Script filled per Neurology Protocol. Dose verified with last appointment note 1/19/24.   
0 = independent
